# Patient Record
Sex: FEMALE | Race: WHITE | NOT HISPANIC OR LATINO | Employment: FULL TIME | URBAN - METROPOLITAN AREA
[De-identification: names, ages, dates, MRNs, and addresses within clinical notes are randomized per-mention and may not be internally consistent; named-entity substitution may affect disease eponyms.]

---

## 2023-02-22 ENCOUNTER — OFFICE VISIT (OUTPATIENT)
Dept: URGENT CARE | Facility: CLINIC | Age: 58
End: 2023-02-22
Payer: COMMERCIAL

## 2023-02-22 VITALS
BODY MASS INDEX: 27.48 KG/M2 | TEMPERATURE: 97.3 F | HEART RATE: 73 BPM | WEIGHT: 171 LBS | DIASTOLIC BLOOD PRESSURE: 88 MMHG | OXYGEN SATURATION: 94 % | SYSTOLIC BLOOD PRESSURE: 148 MMHG | HEIGHT: 66 IN

## 2023-02-22 DIAGNOSIS — M79.671 RIGHT FOOT PAIN: ICD-10-CM

## 2023-02-22 DIAGNOSIS — L84 CORN INFECTED: ICD-10-CM

## 2023-02-22 PROCEDURE — 99203 OFFICE O/P NEW LOW 30 MIN: CPT | Performed by: NURSE PRACTITIONER

## 2023-02-22 RX ORDER — PROGESTERONE 100 MG/1
100 CAPSULE ORAL DAILY
COMMUNITY

## 2023-02-22 RX ORDER — ESTRADIOL 0.03 MG/D
FILM, EXTENDED RELEASE TRANSDERMAL
COMMUNITY
Start: 2023-02-10

## 2023-02-22 RX ORDER — CITALOPRAM 40 MG/1
TABLET ORAL
COMMUNITY
Start: 2023-02-02

## 2023-02-22 RX ORDER — TRAZODONE HYDROCHLORIDE 100 MG/1
TABLET ORAL
COMMUNITY
Start: 2023-02-11

## 2023-02-22 RX ORDER — LEVOTHYROXINE SODIUM 0.1 MG/1
125 TABLET ORAL
COMMUNITY

## 2023-02-22 RX ORDER — CEPHALEXIN 500 MG/1
500 CAPSULE ORAL 4 TIMES DAILY
Qty: 20 CAPSULE | Refills: 0 | Status: SHIPPED | OUTPATIENT
Start: 2023-02-22 | End: 2023-02-27

## 2023-02-22 RX ORDER — SIMVASTATIN 10 MG
10 TABLET ORAL
COMMUNITY
Start: 2023-02-17

## 2023-02-22 RX ORDER — LEVOTHYROXINE SODIUM 0.12 MG/1
TABLET ORAL
COMMUNITY
Start: 2023-02-17

## 2023-02-23 NOTE — PROGRESS NOTES
"  Subjective:     Sangeeta Strong is a 57 y.o. female who presents for Toe Pain (R foot - 5th digit pain x 1 month)      Presents for foot pain. States she's had a corn on her right 5th toe. Has not resolved with typical OTC tx. Currently has a bandage to protect the area. Pain was significant today at work; throbbing pain. Denies injury.              Foot Problem  This is a new problem. The current episode started today. Pertinent negatives include no fever.     History reviewed. No pertinent past medical history.    History reviewed. No pertinent surgical history.    Social History     Socioeconomic History    Marital status:      Spouse name: Not on file    Number of children: Not on file    Years of education: Not on file    Highest education level: Not on file   Occupational History    Not on file   Tobacco Use    Smoking status: Not on file    Smokeless tobacco: Not on file   Substance and Sexual Activity    Alcohol use: Not on file    Drug use: Not on file    Sexual activity: Not on file   Other Topics Concern    Not on file   Social History Narrative    Not on file     Social Determinants of Health     Financial Resource Strain: Not on file   Food Insecurity: Not on file   Transportation Needs: Not on file   Physical Activity: Not on file   Stress: Not on file   Social Connections: Not on file   Intimate Partner Violence: Not on file   Housing Stability: Not on file        History reviewed. No pertinent family history.     Allergies   Allergen Reactions    Morphine Itching       Review of Systems   Constitutional:  Negative for fever.   Musculoskeletal:  Positive for joint pain.   All other systems reviewed and are negative.     Objective:   BP (!) 148/88 (BP Location: Right arm, Patient Position: Sitting, BP Cuff Size: Adult)   Pulse 73   Temp 36.3 °C (97.3 °F)   Ht 1.676 m (5' 6\")   Wt 77.6 kg (171 lb)   SpO2 94%   BMI 27.60 kg/m²     Physical Exam  Vitals reviewed.   Cardiovascular:      Rate " and Rhythm: Normal rate.      Pulses:           Dorsalis pedis pulses are 2+ on the right side.   Pulmonary:      Effort: Pulmonary effort is normal. No respiratory distress.   Musculoskeletal:         General: Tenderness present. No deformity.   Feet:      Right foot:      Skin integrity: Skin breakdown and callus present. No ulcer or warmth.      Comments: Right 5th toe: Open corn to medial aspect, surrounding macerated skin. TTP of 4th and 5th toes. No drainage.   Skin:     General: Skin is warm and dry.      Capillary Refill: Capillary refill takes less than 2 seconds.   Neurological:      Mental Status: She is alert and oriented to person, place, and time.   Psychiatric:         Behavior: Behavior normal.       Assessment/Plan:   1. Corn infected  - Referral to Podiatry  - cephALEXin (KEFLEX) 500 MG Cap; Take 1 Capsule by mouth 4 times a day for 5 days.  Dispense: 20 Capsule; Refill: 0    2. Right foot pain  -Clean area with gentle soap and water.  -Keep open to air when possible.  -Tylenol as directed for pain. Can use ibuprofen sparingly.   -Rest and elevation  -Wear shoes that are not too tight around your toes.  -Can use padding to protect the area.   -Follow up with PCP or Podiatrist.     Monitor for any signs of infection (redness, pus, pain, increased swelling or fever) and follow up if such occurs.    -Differential to include the start of cellulitis with tissue breakdown and pain. Non- ulcerated. Discussed initial conservative measures with acute pain x 1 day. No hx of DM or neuropathy.     Differential diagnosis, natural history, supportive care, and indications for immediate follow-up discussed.

## 2023-02-23 NOTE — PATIENT INSTRUCTIONS
-Clean area with gentle soap and water.  -Keep open to air when possible.  -Tylenol as directed for pain. Can use ibuprofen sparingly.   -Rest and elevation  -Wear shoes that are not too tight around your toes.  -Can use padding to protect the area.   -Follow up with PCP or Podiatrist.     Monitor for any signs of infection (redness, pus, pain, increased swelling or fever) and follow up if such occurs.

## 2023-02-24 ASSESSMENT — ENCOUNTER SYMPTOMS: FEVER: 0
